# Patient Record
Sex: FEMALE | Race: OTHER | HISPANIC OR LATINO | Employment: FULL TIME | URBAN - METROPOLITAN AREA
[De-identification: names, ages, dates, MRNs, and addresses within clinical notes are randomized per-mention and may not be internally consistent; named-entity substitution may affect disease eponyms.]

---

## 2022-11-03 ENCOUNTER — OFFICE VISIT (OUTPATIENT)
Dept: OBGYN CLINIC | Facility: CLINIC | Age: 32
End: 2022-11-03

## 2022-11-03 VITALS — DIASTOLIC BLOOD PRESSURE: 72 MMHG | SYSTOLIC BLOOD PRESSURE: 110 MMHG | WEIGHT: 117 LBS

## 2022-11-03 DIAGNOSIS — Z01.419 ENCOUNTER FOR WELL WOMAN EXAM WITH ROUTINE GYNECOLOGICAL EXAM: Primary | ICD-10-CM
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
## 2022-11-09 LAB
CYTOLOGIST CVX/VAG CYTO: NORMAL
DX ICD CODE: NORMAL
OTHER STN SPEC: NORMAL
OTHER STN SPEC: NORMAL
PATH REPORT.FINAL DX SPEC: NORMAL
SL AMB NOTE:: NORMAL
SL AMB SPECIMEN ADEQUACY: NORMAL
SL AMB TEST METHODOLOGY: NORMAL

## 2023-01-12 ENCOUNTER — TELEPHONE (OUTPATIENT)
Dept: OBGYN CLINIC | Facility: CLINIC | Age: 33
End: 2023-01-12

## 2023-01-12 DIAGNOSIS — O46.90 VAGINAL BLEEDING IN PREGNANCY: Primary | ICD-10-CM

## 2023-01-12 NOTE — TELEPHONE ENCOUNTER
Pt called c/o vaginal bleeding  She has a scheduled NOB for 2/23  Currently around 5w  She noticed the bleeding right before she called our office  She had some mild cramping prior  It is heavier than spotting  Pt denies recent intercourse  Informed pt to monitor bleeding and symptoms  Informed pt we put in blood work - HCG quant, type and screen and progesterone  Pt is going to [facility]  Informed pt orders are in her chart and can go today before they close  Let pt know she should go in 48 hours for repeat HCG to check levels  Pt understood and denies further questions or concerns

## 2023-01-13 ENCOUNTER — APPOINTMENT (OUTPATIENT)
Dept: LAB | Facility: HOSPITAL | Age: 33
End: 2023-01-13

## 2023-01-13 DIAGNOSIS — O46.90 VAGINAL BLEEDING IN PREGNANCY: ICD-10-CM

## 2023-01-13 LAB
ABO GROUP BLD: NORMAL
B-HCG SERPL-ACNC: ABNORMAL MIU/ML (ref 0–11.6)
BLD GP AB SCN SERPL QL: NEGATIVE
PROGEST SERPL-MCNC: 26.3 NG/ML
RH BLD: POSITIVE
SPECIMEN EXPIRATION DATE: NORMAL

## 2023-02-21 NOTE — PROGRESS NOTES
Pt presents for initial OB appointment  Pap and PE up top date and cx's done today  MRSA:denies   Varicella: had infection   STD history: denies   Drug/alcohol use history: denies   Slip written for initial OB panel  Genetic testing: reviewed NIPS   Will plan to call MFM today to establish with them  US done in office today: TAUS CRL c/w 11w3d +  bpm, + FM noted  GS c/with 10w4d  [place] IUP      PMHX:denies     POBHX:    PSURGHX:denies       All questions answered  Ultrasound Probe Disinfection    A transvaginal ultrasound was performed     Probe identification: probe serial number [id number]  Disinfection process: Disinfection was performed with High Level Disinfection Process ([employer])    [place] Cassette PA-C

## 2023-02-23 ENCOUNTER — TELEPHONE (OUTPATIENT)
Facility: HOSPITAL | Age: 33
End: 2023-02-23

## 2023-02-23 ENCOUNTER — INITIAL PRENATAL (OUTPATIENT)
Dept: OBGYN CLINIC | Facility: CLINIC | Age: 33
End: 2023-02-23

## 2023-02-23 VITALS
HEIGHT: 60 IN | DIASTOLIC BLOOD PRESSURE: 80 MMHG | BODY MASS INDEX: 22.65 KG/M2 | WEIGHT: 115.4 LBS | SYSTOLIC BLOOD PRESSURE: 112 MMHG

## 2023-02-23 DIAGNOSIS — Z34.90 EARLY STAGE OF PREGNANCY: Primary | ICD-10-CM

## 2023-02-23 NOTE — TELEPHONE ENCOUNTER
Called patient to schedule MFM appointment, based on referral issued to Maternal Fetal Medicine by [place] office  Left voicemail requesting patient to call back and schedule appointment, with office number for return call [phone]

## 2023-02-25 LAB
C TRACH RRNA SPEC QL NAA+PROBE: NEGATIVE
N GONORRHOEA RRNA SPEC QL NAA+PROBE: NEGATIVE
T VAGINALIS RRNA SPEC QL NAA+PROBE: NEGATIVE

## 2023-02-27 ENCOUNTER — ROUTINE PRENATAL (OUTPATIENT)
Facility: HOSPITAL | Age: 33
End: 2023-02-27

## 2023-02-27 VITALS
HEIGHT: 60 IN | BODY MASS INDEX: 22.74 KG/M2 | DIASTOLIC BLOOD PRESSURE: 50 MMHG | HEART RATE: 106 BPM | WEIGHT: 115.8 LBS | SYSTOLIC BLOOD PRESSURE: 112 MMHG

## 2023-02-27 DIAGNOSIS — O36.80X0 ENCOUNTER TO DETERMINE FETAL VIABILITY OF PREGNANCY, SINGLE OR UNSPECIFIED FETUS: Primary | ICD-10-CM

## 2023-02-27 DIAGNOSIS — Z36.82 ENCOUNTER FOR NUCHAL TRANSLUCENCY TESTING: ICD-10-CM

## 2023-02-27 DIAGNOSIS — Z3A.12 12 WEEKS GESTATION OF PREGNANCY: ICD-10-CM

## 2023-02-27 DIAGNOSIS — Z34.90 EARLY STAGE OF PREGNANCY: ICD-10-CM

## 2023-02-27 NOTE — PROGRESS NOTES
Abel Palacios presents today for a genetic screening ultrasound  This is her first pregnancy  She has no significant medical or surgical history  She has prenatal vitamins and has no known drug allergies  Substance use history is unremarkable  Family history is significant for her mother who had ovarian cancer at the age of 61 currently alive and doing well after surgery and radiation  There is no other significant contributory family medical history  We discussed the options for genetic screening, including but not limited to first trimester screening, second trimester screening, combined first and second trimester screening, noninvasive prenatal screening (NIPS) for patients at high risk and diagnostic screening through the use of CVS and amniocentesis  We discussed the risks and benefits of each approach including the sensitivities and false positive rates as well as the difference between a screening test and a diagnostic test  At the conclusion of our discussion the patient elected noninvasive prenatal testing utilizing the [facility] Non-invasive prenatal screening (NIPS) test  The patient had this blood work drawn in the office and the results should be available approximately 7-10 days after her blood draw  Her results will be reported from [facility]  We discussed follow-up in detail and I recommend an anatomy ultrasound be scheduled for 20 weeks gestation  Thank you very much for allowing us to participate in the care of this very nice patient  Should you have any questions, please do not hesitate to contact me  Portions of the record may have been created with voice recognition software  Occasional wrong word or "sound a like" substitutions may have occurred due to the inherent limitations of voice recognition software  Read the chart carefully and recognize, using context, where substitutions have occurred

## 2023-02-27 NOTE — PROGRESS NOTES
Patient chose to have [facility] Non-invasive Prenatal Screen without fetal sex  Patient given brochure and is aware [facility] will contact their insurance and coordinate coverage  Patient made aware she will need to respond to text message or e-mail from [employer] within 2 business days or testing will be run through insurance  Patient informed text message will come from area code  "415"  Provided [employer] # [phone] and web site : [email or web address] [email or web address]    "[facility] your test online" card with barcode and test tube ID provided to patient  Reviewed [facility]'s web site states 5-7 business days for results via their portal    CC video message will be sent to patient when MFM receives results /provider reviews  2 vials of blood drawn from left arm by Dari Victoria RN  Patient tolerated blood draw without difficulty  Specimens labeled with patient identifiers (name, date of birth, specimen collection date), order and specimen were verified with patient, packed and sent via [employer] [facility]  Copy of lab order scanned to Epic media  Maternal Fetal Medicine will have results in approximately 7-10 business days and will call patient or notify via [address]  Patient aware viewing lab result online will reveal fetal sex if ordered  Patient verbalized understanding of all instructions and no questions at this time

## 2023-02-27 NOTE — LETTER
February 27, 2023     Maryann Macias PA-C  [address] [address]    Patient: Chivo Guzman   YOB: 1990   Date of Visit: 2/27/2023       Dear Dr Karma Haynes: Thank you for referring Chivo Guzman to me for evaluation  Below are my notes for this consultation  If you have questions, please do not hesitate to call me  I look forward to following your patient along with you  Sincerely,        Shayna Grady MD        CC: No Recipients  Shayna Grady MD  2/27/2023  4:19 PM  Sign when Signing Visit  Dagmar Huang presents today for a genetic screening ultrasound  This is her first pregnancy  She has no significant medical or surgical history  She has prenatal vitamins and has no known drug allergies  Substance use history is unremarkable  Family history is significant for her mother who had ovarian cancer at the age of 61 currently alive and doing well after surgery and radiation  There is no other significant contributory family medical history  We discussed the options for genetic screening, including but not limited to first trimester screening, second trimester screening, combined first and second trimester screening, noninvasive prenatal screening (NIPS) for patients at high risk and diagnostic screening through the use of CVS and amniocentesis  We discussed the risks and benefits of each approach including the sensitivities and false positive rates as well as the difference between a screening test and a diagnostic test  At the conclusion of our discussion the patient elected noninvasive prenatal testing utilizing the [facility] Non-invasive prenatal screening (NIPS) test  The patient had this blood work drawn in the office and the results should be available approximately 7-10 days after her blood draw  Her results will be [facility] from [facility]  We discussed follow-up in detail and I recommend an anatomy ultrasound be scheduled for 20 weeks gestation      Thank you very much for allowing us to participate in the care of this very nice patient  Should you have any questions, please do not hesitate to contact me  Portions of the record may have been created with voice recognition software  Occasional wrong word or "sound a like" substitutions may have occurred due to the inherent limitations of voice recognition software  Read the chart carefully and recognize, using context, where substitutions have occurred

## 2023-05-15 ENCOUNTER — TELEPHONE (OUTPATIENT)
Facility: HOSPITAL | Age: 33
End: 2023-05-15

## 2023-05-15 NOTE — TELEPHONE ENCOUNTER
States after she saw the message from the nurse she did call [facility] and they do test for it (gender) they just do not include it in the report but they do have her results  They just need us to send them a message to include it and they would send it over  Patient is requesting this is done

## 2023-05-16 ENCOUNTER — TELEPHONE (OUTPATIENT)
Dept: PERINATAL CARE | Facility: OTHER | Age: 33
End: 2023-05-16

## 2023-05-16 NOTE — TELEPHONE ENCOUNTER
TC to patient  Reported NIPT results screen negative  Patient said she was aware of the result and the gender of the baby  Patient told mr that she had changed the OB to be in [place] because of her insurance and her pregnancy is still going well

## 2025-03-03 ENCOUNTER — TELEPHONE (OUTPATIENT)
Age: 35
End: 2025-03-03

## 2025-03-03 NOTE — TELEPHONE ENCOUNTER
Patient requesting pap results from 1/13/23 be faxed to current obgyn, [facility].  Fax # [phone].    She states she had to change health insurance,signed a request to send records.  All records transferred except the pap results.

## 2025-03-04 NOTE — TELEPHONE ENCOUNTER
Patient called back with the correct fax # to refax over pap results to her new provider. FAX# [phone]  Patient has her appt today